# Patient Record
Sex: MALE | Race: WHITE | NOT HISPANIC OR LATINO | URBAN - NONMETROPOLITAN AREA
[De-identification: names, ages, dates, MRNs, and addresses within clinical notes are randomized per-mention and may not be internally consistent; named-entity substitution may affect disease eponyms.]

---

## 2022-09-27 ENCOUNTER — OFFICE VISIT (OUTPATIENT)
Dept: URGENT CARE | Facility: CLINIC | Age: 32
End: 2022-09-27

## 2022-09-27 ENCOUNTER — APPOINTMENT (OUTPATIENT)
Dept: RADIOLOGY | Facility: IMAGING CENTER | Age: 32
End: 2022-09-27
Attending: FAMILY MEDICINE

## 2022-09-27 VITALS
RESPIRATION RATE: 18 BRPM | TEMPERATURE: 99.9 F | HEART RATE: 117 BPM | SYSTOLIC BLOOD PRESSURE: 124 MMHG | HEIGHT: 71 IN | OXYGEN SATURATION: 96 % | BODY MASS INDEX: 33.01 KG/M2 | WEIGHT: 235.8 LBS | DIASTOLIC BLOOD PRESSURE: 70 MMHG

## 2022-09-27 DIAGNOSIS — M25.512 CHRONIC LEFT SHOULDER PAIN: ICD-10-CM

## 2022-09-27 DIAGNOSIS — R25.2 SPASM: ICD-10-CM

## 2022-09-27 DIAGNOSIS — G89.29 CHRONIC LEFT SHOULDER PAIN: ICD-10-CM

## 2022-09-27 PROCEDURE — 99203 OFFICE O/P NEW LOW 30 MIN: CPT | Performed by: FAMILY MEDICINE

## 2022-09-27 PROCEDURE — 73030 X-RAY EXAM OF SHOULDER: CPT | Mod: TC,LT | Performed by: RADIOLOGY

## 2022-09-27 RX ORDER — KETOROLAC TROMETHAMINE 30 MG/ML
30 INJECTION, SOLUTION INTRAMUSCULAR; INTRAVENOUS ONCE
Status: COMPLETED | OUTPATIENT
Start: 2022-09-27 | End: 2022-09-27

## 2022-09-27 RX ORDER — CYCLOBENZAPRINE HCL 10 MG
10 TABLET ORAL 3 TIMES DAILY PRN
Qty: 20 TABLET | Refills: 0 | Status: SHIPPED | OUTPATIENT
Start: 2022-09-27

## 2022-09-27 RX ADMIN — KETOROLAC TROMETHAMINE 30 MG: 30 INJECTION, SOLUTION INTRAMUSCULAR; INTRAVENOUS at 16:43

## 2022-09-27 NOTE — PROGRESS NOTES
"Subjective     Larry Delgado is a 32 y.o. male who presents with Shoulder Pain ((L) shoulder x 3 weeks; hx of shoulder pain x years;  )            Chronic bilateral shoulder pain.  Recent worsening of left shoulder pain over the last 3 weeks.  Severe.  Worse with abduction.  No recent or prior trauma.  He notes that he is a  and holds his arm in front of him for long periods of time.  He has associated left trapezius pain and spasm.  No midline neck pain.  No function or sensory loss.  No other aggravating alleviating factors.      Review of Systems   Constitutional:  Negative for malaise/fatigue and weight loss.   Eyes:  Negative for discharge and redness.   Gastrointestinal:  Negative for nausea and vomiting.   Musculoskeletal:  Negative for joint pain and myalgias.   Skin:  Negative for itching and rash.            Objective     /70 (BP Location: Right arm, Patient Position: Sitting)   Pulse (!) 117   Temp 37.7 °C (99.9 °F) (Temporal)   Resp 18   Ht 1.803 m (5' 11\")   Wt 107 kg (235 lb 12.8 oz)   SpO2 96%   BMI 32.89 kg/m²      Physical Exam  Constitutional:       General: He is not in acute distress.     Appearance: He is well-developed.   HENT:      Head: Normocephalic and atraumatic.   Cardiovascular:      Heart sounds: No murmur heard.  Pulmonary:      Breath sounds: No wheezing.   Musculoskeletal:      Cervical back: Normal range of motion and neck supple. No rigidity.      Comments: Left shoulder: Diffusely tender including trapezius with associated spasm.  Pain reproduced with movement in all planes.  No weakness.  Distal neurovascular intact.   Skin:     General: Skin is warm and dry.      Findings: No rash.   Neurological:      Mental Status: He is alert.                           Assessment & Plan        X-ray negative per radiology    1. Chronic left shoulder pain  DX-SHOULDER 2+ LEFT    ketorolac (TORADOL) injection 30 mg      2. Spasm  cyclobenzaprine " (FLEXERIL) 10 mg Tab        Differential diagnosis, natural history, supportive care, and indications for immediate follow-up discussed.     Larry lives in [place].  He will follow-up with primary care there for further evaluation and treatment.

## 2022-09-27 NOTE — LETTER
September 27, 2022         Patient: Larry Delgado   YOB: 1990   Date of Visit: 9/27/2022           To Whom it May Concern:    Larry Delgado was seen in my clinic on 9/27/2022. He is safe to continue driving. I recommend that he follow up at home in [facility] and initiate physical therapy.         Francois Camarena M.D.  Electronically Signed

## 2022-09-29 ASSESSMENT — ENCOUNTER SYMPTOMS
MYALGIAS: 0
VOMITING: 0
NAUSEA: 0
EYE DISCHARGE: 0
WEIGHT LOSS: 0
EYE REDNESS: 0